# Patient Record
(demographics unavailable — no encounter records)

---

## 2025-02-26 NOTE — PROCEDURE
[Hoarseness] : hoarseness not clearly evaluated by indirect laryngoscopy [Topical Lidocaine] : topical lidocaine [Oxymetazoline HCl] : oxymetazoline HCl [Flexible Endoscope] : examined with the flexible endoscope [Normal] : the false vocal folds were pink and regular, the ventricular sulcus was open, the true vocal folds were glistening white, tense and of equal length, mobility, and height [Interarytenoid Edema] : interarytenoid area edematous

## 2025-03-26 NOTE — PROCEDURE
[Hoarseness] : hoarseness not clearly evaluated by indirect laryngoscopy [Topical Lidocaine] : topical lidocaine [Oxymetazoline HCl] : oxymetazoline HCl [Flexible Endoscope] : examined with the flexible endoscope [Lesion(s)] : lesion(s) [___] : [unfilled]Ucm polyp/cyst on the right [True Vocal Cords Bryan's Nodules] : vocal cord nodule(s) on the right [Normal] : posterior cricoid area had healthy pink mucosa in the interarytenoid area and the esophageal inlet [Serial Number: ___] : Serial Number: [unfilled] [de-identified] : done to rechck as hoarseness has not improved iah significantly improved but 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

## 2025-03-26 NOTE — HISTORY OF PRESENT ILLNESS
[de-identified] : 1 mo follow up appt for this 82 yo m here with his wife. 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 He reports that he increased his omeprazole to 40 mg and is somewhat complaint with diet, but some things, e.g. etoh are not negotiable. Denies aspiration. He feels his hoarseness is unchanged.

## 2025-03-26 NOTE — PHYSICAL EXAM
[Laryngoscopy Performed] : laryngoscopy was performed, see procedure section for findings [Normal] : no rashes [de-identified] : gait steady

## 2025-03-26 NOTE — PHYSICAL EXAM
[Laryngoscopy Performed] : laryngoscopy was performed, see procedure section for findings [Normal] : no rashes [de-identified] : gait steady

## 2025-03-26 NOTE — ASSESSMENT
[FreeTextEntry1] : reflux change improved but he is still hoarse with small r posterior tvc submucosal lesion see Dr Maxwell - asst asked to make appt for him,; he agreed also presbylarynges - to address with Dr Maxwell

## 2025-03-26 NOTE — PROCEDURE
[Hoarseness] : hoarseness not clearly evaluated by indirect laryngoscopy [Topical Lidocaine] : topical lidocaine [Oxymetazoline HCl] : oxymetazoline HCl [Flexible Endoscope] : examined with the flexible endoscope [Lesion(s)] : lesion(s) [___] : [unfilled]Ucm polyp/cyst on the right [True Vocal Cords Bryan's Nodules] : vocal cord nodule(s) on the right [Normal] : posterior cricoid area had healthy pink mucosa in the interarytenoid area and the esophageal inlet [Serial Number: ___] : Serial Number: [unfilled] [de-identified] : done to rechck as hoarseness has not improved iah significantly improved but 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

## 2025-03-26 NOTE — HISTORY OF PRESENT ILLNESS
[de-identified] : 1 mo follow up appt for this 82 yo m here with his wife. 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 He reports that he increased his omeprazole to 40 mg and is somewhat complaint with diet, but some things, e.g. etoh are not negotiable. Denies aspiration. He feels his hoarseness is unchanged.

## 2025-04-07 NOTE — HISTORY OF PRESENT ILLNESS
[de-identified] : - 4/7/25 80 y/o M referred by Dr. 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 States his voice has a raspy quality, constant throughout the day. Sometimes worse than how it is now, no known aggravating factors. Says his voice gets fairly dry at night. Mild/moderate voice demands. No throat pain. No issues eating, chewing, or swallowing. No food regurgitation. No breathing issues. History of heart burn. Prescribed omeprazole previously every other day for 2 years, increased to twice daily by Dr. Spicer for the past 6 weeks, no change in voice. No smoking history.   Diet: +coffee (0.5/day), garlic, onion, tomato, spicy, citrus (small amount), chocolate, EtOH (gin & tonic x 1/day) -coffee, tea, soda, carbonated beverages, mint water intake:  1-1.5 quart per day late night eating: yes (1-1.5 hours)  After observing large septal perforation on exam, patient reports history of prior nasal surgery. Denies significant nasal complaints at this time. Minor crusting no epistaxis. Here with his wife who assists with history taking.  -

## 2025-04-07 NOTE — PROCEDURE
[de-identified] : - Procedure Note  Pre-operative Diagnosis: Dysphonia Post-operative Diagnosis: presbylarynges, right vocal fold lesion, muscle tension dysphonia Anesthesia: Topical - 1 % Lidocaine/Phenylephrine Procedure: Flexible Laryngoscopy with Stroboscopy - [facility] 64733  Procedure Details: The patient was placed in the sitting position. After decongestant and anesthesia were applied the laryngoscope was passed. The nasal cavities, nasopharynx, oropharynx, hypopharynx, and larynx were all examined. Vocal folds were examined during respiration and phonation. The following findings were noted:  Findings: Nose: Septum is midline, turbinates are normal, nasal airways patent, mucosa normal Nasopharynx: Adenoids normal, no masses, eustachian tube normal Oropharynx: Pharyngeal walls symmetric and without lesion. Tonsils/fossae symmetric Hypopharynx: Hypopharynx and pyriform sinuses without lesion. No masses or asymmetry. No pooling of secretions. Larynx: Epiglottis and aryepiglottic folds were sharp and crisp bilaterally. Bilateral false vocal folds normal appearance. Airway was widely patent. + postcricoid edema, erythema of the vocal process, + intra-arytenoid bar  Strobe Exam Ratings  TVF Appearance: bilateral vocal atrophy, right posterior additive mass lesion (polypoid vs cyst) TVF Mobility: normal Edema/hypertrophy: normal, + postcricoid edema Mucus on TVF: normal Glottic Closure: +glottic gap Mucosal Wave: asymmetric Amplitude of Vibration: decreased Phase: asymmetric Supraglottic Hyperfunction: +supraglottic compression Other Findings: none  Condition: Stable. Patient tolerated procedure well.  Complications: None  --------------------------------------------------------------------  Procedure: Pharyngeal and speech evaluation, by cine or video - CPT 54907 Voice assessment was recorded via video recording on this date.  Clarity: raspy Range: reduced Pitch Control: reduced Projection: decreased Tremor: none Cough: none  Condition: Stable. Patient tolerated procedure well. Complications: None.

## 2025-04-07 NOTE — ASSESSMENT
[FreeTextEntry1] :  - 4/7/25 82 y/o M referred by Dr. Spicer with dysphonia, previously intermittent now persistent for 3-4 months. 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 We discussed options including surgical excision of additive mass lesion with bilateral vocal fold injection augmentation. Risk, benefits and alternatives of surgery were discussed including bleeding, infection, pain, risk of anesthesia, problems chewing or swallowing, changed or worsened voice, chipped teeth, tongue numbness, taste disturbance, referred ear pain, need for further procedures and possible time off of work. Patient would like to proceed. In the meantime, I recommended voice evaluation and therapy. If voice outcomes improved could postpone or cancel surgery. Follow up after voice therapy, sooner if any voice changes or other new symptoms.   -Continue Omeprazole once a day -Plan for OR for suspension [employer] laryngoscopy with KTP laser ablation of right vocal cord lesion and bilateral vocal fold injection augmentation. Has plans to travel in [place] in October -Consultation with SLP for voice therapy -Follow up after voice therapy.

## 2025-04-07 NOTE — PHYSICAL EXAM
[Midline] : trachea located in midline position [Normal] : temporomandibular joint is normal [de-identified] : large septal perforation with vascularity no crusting